# Patient Record
Sex: FEMALE | Race: WHITE | NOT HISPANIC OR LATINO | URBAN - METROPOLITAN AREA
[De-identification: names, ages, dates, MRNs, and addresses within clinical notes are randomized per-mention and may not be internally consistent; named-entity substitution may affect disease eponyms.]

---

## 2023-01-07 ENCOUNTER — EMERGENCY (EMERGENCY)
Facility: HOSPITAL | Age: 88
LOS: 0 days | Discharge: HOME | End: 2023-01-08
Attending: EMERGENCY MEDICINE
Payer: COMMERCIAL

## 2023-01-07 VITALS — WEIGHT: 110.23 LBS | HEIGHT: 60.63 IN

## 2023-01-07 DIAGNOSIS — S60.222A CONTUSION OF LEFT HAND, INITIAL ENCOUNTER: ICD-10-CM

## 2023-01-07 DIAGNOSIS — Z23 ENCOUNTER FOR IMMUNIZATION: ICD-10-CM
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
## 2023-01-07 DIAGNOSIS — M85.88 OTHER SPECIFIED DISORDERS OF BONE DENSITY AND STRUCTURE, OTHER SITE: ICD-10-CM

## 2023-01-07 DIAGNOSIS — R91.1 SOLITARY PULMONARY NODULE: ICD-10-CM

## 2023-01-07 DIAGNOSIS — H40.9 UNSPECIFIED GLAUCOMA: ICD-10-CM

## 2023-01-07 DIAGNOSIS — I70.0 ATHEROSCLEROSIS OF AORTA: ICD-10-CM

## 2023-01-07 DIAGNOSIS — K44.9 DIAPHRAGMATIC HERNIA WITHOUT OBSTRUCTION OR GANGRENE: ICD-10-CM
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
## 2023-01-07 DIAGNOSIS — S01.81XA LACERATION WITHOUT FOREIGN BODY OF OTHER PART OF HEAD, INITIAL ENCOUNTER: ICD-10-CM

## 2023-01-07 DIAGNOSIS — S00.03XA CONTUSION OF SCALP, INITIAL ENCOUNTER: ICD-10-CM

## 2023-01-07 DIAGNOSIS — K57.30 DIVERTICULOSIS OF LARGE INTESTINE WITHOUT PERFORATION OR ABSCESS WITHOUT BLEEDING: ICD-10-CM

## 2023-01-07 DIAGNOSIS — I10 ESSENTIAL (PRIMARY) HYPERTENSION: ICD-10-CM

## 2023-01-07 DIAGNOSIS — I25.10 ATHEROSCLEROTIC HEART DISEASE OF NATIVE CORONARY ARTERY WITHOUT ANGINA PECTORIS: ICD-10-CM

## 2023-01-07 DIAGNOSIS — M43.12 SPONDYLOLISTHESIS, CERVICAL REGION: ICD-10-CM

## 2023-01-07 DIAGNOSIS — S80.02XA CONTUSION OF LEFT KNEE, INITIAL ENCOUNTER: ICD-10-CM
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
## 2023-01-07 DIAGNOSIS — N28.1 CYST OF KIDNEY, ACQUIRED: ICD-10-CM

## 2023-01-07 DIAGNOSIS — M25.78 OSTEOPHYTE, VERTEBRAE: ICD-10-CM

## 2023-01-07 DIAGNOSIS — E78.5 HYPERLIPIDEMIA, UNSPECIFIED: ICD-10-CM

## 2023-01-07 LAB
ALBUMIN SERPL ELPH-MCNC: 3.5 G/DL — SIGNIFICANT CHANGE UP (ref 3.3–5)
ALP SERPL-CCNC: 60 U/L — SIGNIFICANT CHANGE UP (ref 40–120)
ALT FLD-CCNC: 14 U/L — SIGNIFICANT CHANGE UP (ref 12–78)
ANION GAP SERPL CALC-SCNC: 8 MMOL/L — SIGNIFICANT CHANGE UP (ref 5–17)
APTT BLD: 21.7 SEC — LOW (ref 27.5–35.5)
AST SERPL-CCNC: 19 U/L — SIGNIFICANT CHANGE UP (ref 15–37)
BASOPHILS # BLD AUTO: 0.06 K/UL — SIGNIFICANT CHANGE UP (ref 0–0.2)
BASOPHILS NFR BLD AUTO: 0.6 % — SIGNIFICANT CHANGE UP (ref 0–2)
BILIRUB SERPL-MCNC: 0.3 MG/DL — SIGNIFICANT CHANGE UP (ref 0.2–1.2)
BLD GP AB SCN SERPL QL: SIGNIFICANT CHANGE UP
BUN SERPL-MCNC: 30 MG/DL — HIGH (ref 7–23)
CALCIUM SERPL-MCNC: 9.1 MG/DL — SIGNIFICANT CHANGE UP (ref 8.5–10.1)
CHLORIDE SERPL-SCNC: 114 MMOL/L — HIGH (ref 96–108)
CK SERPL-CCNC: 53 U/L — SIGNIFICANT CHANGE UP (ref 26–192)
CO2 SERPL-SCNC: 20 MMOL/L — LOW (ref 22–31)
CREAT SERPL-MCNC: 0.78 MG/DL — SIGNIFICANT CHANGE UP (ref 0.5–1.3)
EGFR: 73 ML/MIN/1.73M2 — SIGNIFICANT CHANGE UP
EOSINOPHIL # BLD AUTO: 0.16 K/UL — SIGNIFICANT CHANGE UP (ref 0–0.5)
EOSINOPHIL NFR BLD AUTO: 1.6 % — SIGNIFICANT CHANGE UP (ref 0–6)
ETHANOL SERPL-MCNC: <10 MG/DL — SIGNIFICANT CHANGE UP (ref 0–10)
FLUAV AG NPH QL: SIGNIFICANT CHANGE UP
FLUBV AG NPH QL: SIGNIFICANT CHANGE UP
GLUCOSE BLDC GLUCOMTR-MCNC: 109 MG/DL — HIGH (ref 70–99)
GLUCOSE SERPL-MCNC: 126 MG/DL — HIGH (ref 70–99)
HCT VFR BLD CALC: 40.4 % — SIGNIFICANT CHANGE UP (ref 34.5–45)
HGB BLD-MCNC: 12.8 G/DL — SIGNIFICANT CHANGE UP (ref 11.5–15.5)
IMM GRANULOCYTES NFR BLD AUTO: 0.4 % — SIGNIFICANT CHANGE UP (ref 0–0.9)
INR BLD: 0.94 RATIO — SIGNIFICANT CHANGE UP (ref 0.88–1.16)
LIDOCAIN IGE QN: 255 U/L — SIGNIFICANT CHANGE UP (ref 73–393)
LYMPHOCYTES # BLD AUTO: 1.28 K/UL — SIGNIFICANT CHANGE UP (ref 1–3.3)
LYMPHOCYTES # BLD AUTO: 12.6 % — LOW (ref 13–44)
MCHC RBC-ENTMCNC: 30.5 PG — SIGNIFICANT CHANGE UP (ref 27–34)
MCHC RBC-ENTMCNC: 31.7 GM/DL — LOW (ref 32–36)
MCV RBC AUTO: 96.4 FL — SIGNIFICANT CHANGE UP (ref 80–100)
MONOCYTES # BLD AUTO: 0.65 K/UL — SIGNIFICANT CHANGE UP (ref 0–0.9)
MONOCYTES NFR BLD AUTO: 6.4 % — SIGNIFICANT CHANGE UP (ref 2–14)
NEUTROPHILS # BLD AUTO: 7.93 K/UL — HIGH (ref 1.8–7.4)
NEUTROPHILS NFR BLD AUTO: 78.4 % — HIGH (ref 43–77)
PLATELET # BLD AUTO: 441 K/UL — HIGH (ref 150–400)
POTASSIUM SERPL-MCNC: 3.8 MMOL/L — SIGNIFICANT CHANGE UP (ref 3.5–5.3)
POTASSIUM SERPL-SCNC: 3.8 MMOL/L — SIGNIFICANT CHANGE UP (ref 3.5–5.3)
PROT SERPL-MCNC: 7.2 GM/DL — SIGNIFICANT CHANGE UP (ref 6–8.3)
PROTHROM AB SERPL-ACNC: 10.9 SEC — SIGNIFICANT CHANGE UP (ref 10.5–13.4)
RBC # BLD: 4.19 M/UL — SIGNIFICANT CHANGE UP (ref 3.8–5.2)
RBC # FLD: 13.5 % — SIGNIFICANT CHANGE UP (ref 10.3–14.5)
RSV RNA NPH QL NAA+NON-PROBE: SIGNIFICANT CHANGE UP
SARS-COV-2 RNA SPEC QL NAA+PROBE: SIGNIFICANT CHANGE UP
SODIUM SERPL-SCNC: 142 MMOL/L — SIGNIFICANT CHANGE UP (ref 135–145)
TROPONIN I, HIGH SENSITIVITY RESULT: 11.76 NG/L — SIGNIFICANT CHANGE UP
WBC # BLD: 10.12 K/UL — SIGNIFICANT CHANGE UP (ref 3.8–10.5)
WBC # FLD AUTO: 10.12 K/UL — SIGNIFICANT CHANGE UP (ref 3.8–10.5)

## 2023-01-07 PROCEDURE — 71045 X-RAY EXAM CHEST 1 VIEW: CPT

## 2023-01-07 PROCEDURE — 83690 ASSAY OF LIPASE: CPT

## 2023-01-07 PROCEDURE — 82962 GLUCOSE BLOOD TEST: CPT

## 2023-01-07 PROCEDURE — 90471 IMMUNIZATION ADMIN: CPT

## 2023-01-07 PROCEDURE — 93005 ELECTROCARDIOGRAM TRACING: CPT

## 2023-01-07 PROCEDURE — 74177 CT ABD & PELVIS W/CONTRAST: CPT | Mod: MA

## 2023-01-07 PROCEDURE — 86901 BLOOD TYPING SEROLOGIC RH(D): CPT

## 2023-01-07 PROCEDURE — 73552 X-RAY EXAM OF FEMUR 2/>: CPT | Mod: LT

## 2023-01-07 PROCEDURE — 86850 RBC ANTIBODY SCREEN: CPT

## 2023-01-07 PROCEDURE — 70450 CT HEAD/BRAIN W/O DYE: CPT | Mod: MA

## 2023-01-07 PROCEDURE — 85025 COMPLETE CBC W/AUTO DIFF WBC: CPT

## 2023-01-07 PROCEDURE — 99285 EMERGENCY DEPT VISIT HI MDM: CPT | Mod: 25

## 2023-01-07 PROCEDURE — 84484 ASSAY OF TROPONIN QUANT: CPT

## 2023-01-07 PROCEDURE — 85610 PROTHROMBIN TIME: CPT

## 2023-01-07 PROCEDURE — 80053 COMPREHEN METABOLIC PANEL: CPT

## 2023-01-07 PROCEDURE — 73610 X-RAY EXAM OF ANKLE: CPT | Mod: LT

## 2023-01-07 PROCEDURE — 90715 TDAP VACCINE 7 YRS/> IM: CPT

## 2023-01-07 PROCEDURE — 73140 X-RAY EXAM OF FINGER(S): CPT | Mod: LT

## 2023-01-07 PROCEDURE — 73502 X-RAY EXAM HIP UNI 2-3 VIEWS: CPT | Mod: LT

## 2023-01-07 PROCEDURE — 36415 COLL VENOUS BLD VENIPUNCTURE: CPT

## 2023-01-07 PROCEDURE — 0241U: CPT

## 2023-01-07 PROCEDURE — 73590 X-RAY EXAM OF LOWER LEG: CPT | Mod: LT

## 2023-01-07 PROCEDURE — 71260 CT THORAX DX C+: CPT | Mod: MA

## 2023-01-07 PROCEDURE — 85730 THROMBOPLASTIN TIME PARTIAL: CPT

## 2023-01-07 PROCEDURE — 99285 EMERGENCY DEPT VISIT HI MDM: CPT

## 2023-01-07 PROCEDURE — 86900 BLOOD TYPING SEROLOGIC ABO: CPT

## 2023-01-07 PROCEDURE — 93010 ELECTROCARDIOGRAM REPORT: CPT

## 2023-01-07 PROCEDURE — 73562 X-RAY EXAM OF KNEE 3: CPT | Mod: LT

## 2023-01-07 PROCEDURE — 82550 ASSAY OF CK (CPK): CPT

## 2023-01-07 PROCEDURE — 72125 CT NECK SPINE W/O DYE: CPT | Mod: MA

## 2023-01-07 PROCEDURE — 80307 DRUG TEST PRSMV CHEM ANLYZR: CPT

## 2023-01-07 PROCEDURE — 96374 THER/PROPH/DIAG INJ IV PUSH: CPT

## 2023-01-07 RX ORDER — SODIUM CHLORIDE 9 MG/ML
500 INJECTION INTRAMUSCULAR; INTRAVENOUS; SUBCUTANEOUS ONCE
Refills: 0 | Status: COMPLETED | OUTPATIENT
Start: 2023-01-07 | End: 2023-01-07

## 2023-01-07 RX ORDER — TETANUS TOXOID, REDUCED DIPHTHERIA TOXOID AND ACELLULAR PERTUSSIS VACCINE, ADSORBED 5; 2.5; 8; 8; 2.5 [IU]/.5ML; [IU]/.5ML; UG/.5ML; UG/.5ML; UG/.5ML
0.5 SUSPENSION INTRAMUSCULAR ONCE
Refills: 0 | Status: COMPLETED | OUTPATIENT
Start: 2023-01-07 | End: 2023-01-07

## 2023-01-07 RX ORDER — ONDANSETRON 8 MG/1
4 TABLET, FILM COATED ORAL ONCE
Refills: 0 | Status: COMPLETED | OUTPATIENT
Start: 2023-01-07 | End: 2023-01-07

## 2023-01-07 RX ADMIN — TETANUS TOXOID, REDUCED DIPHTHERIA TOXOID AND ACELLULAR PERTUSSIS VACCINE, ADSORBED 0.5 MILLILITER(S): 5; 2.5; 8; 8; 2.5 SUSPENSION INTRAMUSCULAR at 22:59

## 2023-01-07 RX ADMIN — ONDANSETRON 4 MILLIGRAM(S): 8 TABLET, FILM COATED ORAL at 23:50

## 2023-01-07 RX ADMIN — SODIUM CHLORIDE 500 MILLILITER(S): 9 INJECTION INTRAMUSCULAR; INTRAVENOUS; SUBCUTANEOUS at 22:00

## 2023-01-07 NOTE — ED PROVIDER NOTE - NSFOLLOWUPINSTRUCTIONS_ED_ALL_ED_FT
ED workup for acute trauma: negative.  Tylenol 325 mg 2 tabs every 6 hours as needed for headache, aches & pains.  Continue your regular medications as per routine.  Rest.  Minimize physical activity.  Follow up with own doctor when you arrive back home.        Lesión en la bela en los adultos    Head Injury, Adult       Hay muchos tipos de lesiones en la bela. Algunas pueden ser tan leves dileep un chichón pequeño. Otras pueden ser más graves. Ejemplos de lesiones graves:  •Un golpe jennifer en la bela que sacude el cerebro hacia dm y atrás (conmoción cerebral).      •Un moretón (contusión) en el cerebro. Eros significa que hay hemorragia en el cerebro que puede causar hinchazón.      •Fisura en el cráneo (fractura de cráneo).      •Hemorragia en el cerebro que se acumula, se espesa (se produce un coágulo) y forma un bulto (hematoma).      La mayoría de los problemas provocados por josiane lesión en la bela ocurren vinicius las primeras 24 horas. Sin embargo, es posible que siga teniendo efectos secundarios entre 7 y 10 días después de la lesión. Es importante controlar vaughn afección para ruma si hay cambios. Es posible que deban observarlo en el departamento de emergencias o en el servicio de atención urgente, o puede ser necesario que se quede en el hospital.      ¿Cuáles son las causas?    Hay muchas causas posibles de josiane lesión en la bela. Josiane lesión en la bela puede tener estas causas:  •Un accidente automovilístico.      •Accidentes en bicicleta o motocicleta.      •Lesiones deportivas.      •Caídas.      •Ser golpeado por un objeto.        ¿Cuáles son los signos o síntomas?    Los síntomas de lesión en la bela incluyen un moretón, un chichón o un sangrado en el lugar de la lesión. Otros síntomas físicos pueden ser:  •Dolor de bela.      •Sensación de que va a vomitar (náuseas) o vomitar.      •Mareos.      •Visión borrosa o doble.      •Sentir incomodidad cerca de luces brillantes o ruidos kaylah.      •Temblores que no puede controlar (convulsiones).      •Cansancio.      •Dificultad para despertarse.      •Desmayos o pérdida de la conciencia.      Los síntomas mentales o emocionales pueden incluir los siguientes:  •Sentirse abrumado o malhumorado.      •Confusión y problemas de memoria.      •Tener problemas para prestar atención o concentrarse.      •Cambios en los hábitos de alimentación o en el sueño.      •Sentirse preocupado o nervioso (ansioso).      •Sentirse jeremie (deprimido).        ¿Cómo se trata?    El tratamiento de esta afección depende de la gravedad de la lesión y del tipo de lesión que sufrió. El objetivo principal es prevenir problemas y darle tiempo al cerebro para que se recupere.    Lesión de bela leve     Si usted sufre josiane lesión de bela leve, es posible que lo envíen a casa, y el tratamiento puede incluir lo siguiente:  •Estar en observación. Un adulto responsable debe quedarse con usted vinicius 24 horas después de producida la lesión y controlarlo con frecuencia.      •[place] físico.      •[place] cerebral.      •Analgésicos.      Lesión de bela grave    Si tiene josiane lesión de bela grave, el tratamiento puede incluir lo siguiente:  •Estar en observación cercana. Eros incluye permanecer en el hospital.    •Medicamentos para:  •Ayudar a aliviar el dolor.      •Evitar las convulsiones.      •Ayudar con la hinchazón del cerebro.        •Proteger las vías respiratorias y usar josiane máquina que lo ayude a respirar (respirador).      •Tratamientos para observar y tratar la hinchazón dentro del cerebro.    •Cirugía de cerebro. Esta puede ser necesaria en los siguientes casos:  •Extraer josiane acumulación de raghavendra o coágulos de raghavendra.      •Interrumpir el sangrado.      •Retirar josiane parte del cráneo. Eros permite que el cerebro tenga lugar para hincharse.          Siga estas instrucciones en vaughn casa:    Actividad     •Raz reposo.      •Evite las actividades difíciles o cansadoras.      •Asegúrese de dormir lo suficiente.    •Deje que el cerebro descanse. Para hacerlo, limite las actividades que requieran pensar mucho o prestar mucha atención, dileep las siguientes:  •Mirar televisión.      •Jugar juegos de memoria y armar rompecabezas.      •Tareas para el hogar o trabajos relacionados con el empleo.      •Trabajar en la computadora, participar en redes sociales y enviar mensajes de texto.        •Evite las actividades que pudieran provocar otra lesión en la bela hasta que el médico lo autorice. Entre ellas, practicar deportes. Puede ser peligroso tener otra lesión en la bela antes de que se haya recuperado de la primera.      •Pregunte al médico cuándo puede regresar a las actividades normales sin riesgo, dileep al trabajo o a la escuela. Pida al médico un plan detallado para volver a realizar las actividades habituales de manera progresiva.      •Consulte a vaughn médico cuándo puede conducir, andar en bicicleta o usar maquinaria pesada. No realice estas actividades si se siente mareado.        Estilo de beckie      • No alexandria alcohol hasta que el médico lo autorice.      • No consuma drogas.      •Si le resulta más difícil que lo habitual recordar las cosas, escríbalas.      •Trate de hacer josiane cosa por vez si se distrae con facilidad.      •Consulte con familiares y amigos si debe alphonse decisiones importantes.      •Cuénteles a shy amigos, familiares, colegas de confianza y vaughn gerente en el trabajo sobre vaughn lesión, los síntomas y shy límites (restricciones). Pídales que observen si aparecen nuevos problemas o empeoran los existentes.      Instrucciones generales     •Starks los medicamentos de venta jayne y los recetados solamente dileep se lo haya indicado el médico.      •Pídale a alguien que lo acompañe vinicius 24 horas después de la lesión en la bela. Esta persona debe observar si hay cambios en los síntomas y estar preparada para obtener ayuda.      •Concurra a todas las visitas de seguimiento dileep se lo haya indicado el médico. Eros es importante.      ¿Cómo se melisa?     •Trabaje en vaughn equilibrio y vaughn fuerza. Eros puede ayudarlo a evitar caídas.      •Use el cinturón de seguridad cuando se encuentre en un vehículo en movimiento.    •Use un hilario cuando realice las siguientes actividades:  •Andar en bicicleta.      •Esquiar.      •Practicar algún otro deporte o actividad que representen un riesgo de lesión.      •Si dave alcohol:•Limite la cantidad que dave:  •De 0 a 1 medida por día para las mujeres que no estén embarazadas.      •De 0 a 2 medidas por día para los hombres.        •Esté atento a la cantidad de alcohol que hay en las bebidas que dominik. En los [place], josiane medida equivale a josiane botella de cerveza de 12 oz (355 ml), un vaso de vino de 5 oz (148 ml) o un vaso de josiane bebida alcohólica de shala graduación de 1½ oz (44 ml).      •Raz de vaughn hogar un lugar más seguro:  •Deshacerse de los obstáculos en pisos y escaleras. Eros incluye las cosas que pueden hacer que se tropiece.      •Coloque barras para sostén en los bi y pasamanos en las escaleras.      •Ponga alfombras antideslizantes en pisos y bañeras.      •Mejore la iluminación de las zonas oscuras.          Dónde buscar más información    •Centers for Disease Control and Prevention (Centros para el Control y la Prevención de Enfermedades): www.cdc.gov        Solicite ayuda de inmediato si:  •Tiene lo siguiente:  •Dolor de bela muy jennifer que no se calma con medicamentos.      •Dificultad para caminar o debilidad en los brazos o las piernas.      •Secreción transparente o con raghavendra que proviene de la nariz o de los oídos.      •Cambios en la forma de ruma (visión).      •Josiane convulsión.      •Más confusión o está más gruñón.        •Shy síntomas empeoran.      •Está más somnoliento de lo normal o tiene dificultad para mantenerse despierto.      •Pierde el equilibrio.      •La parte central spring de los ojos (pupila) cambia de tamaño.      •Arrastra las palabras.      •Vaughn mareo empeora.      •Vomita.      Estos síntomas pueden indicar josiane emergencia. No espere a ruma si los síntomas desaparecen. Solicite atención médica de inmediato. Comuníquese con el servicio de emergencias de vaughn localidad (911 en los [place]). No conduzca por shy propios medios hasta el hospital.       Resumen    •Las lesiones en la bela pueden ser tan leves dileep un pequeño chichón. Otras pueden ser más graves.      •El tratamiento de esta afección depende de la gravedad de la lesión y del tipo de lesión que sufrió.      •Pídale a alguien que lo acompañe vinicius 24 horas después de la lesión en la bela.      •Pregunte al médico cuándo puede regresar a las actividades normales sin riesgo, dileep al trabajo o a la escuela.      •Para evitar josiane lesión en la bela, use cinturón de seguridad cuando se desplace en automóvil, use hilario cuando monte en bicicleta, limite el consumo de alcohol y raz que vaughn hogar sea más seguro.      Esta información no tiene dileep fin reemplazar el consejo del médico. Asegúrese de hacerle al médico cualquier pregunta que tenga.              Contusión    Contusion      Josiane contusión es un hematoma profundo. Es el resultado de josiane lesión que causa sangrado debajo de la piel. Los síntomas de hematoma incluyen dolor, hinchazón y cambio de color en la piel. La piel puede ponerse eduard, morada o amarilla.      Siga estas indicaciones en vaughn casa:      Control del dolor, el entumecimiento y la hinchazón   Bag of ice on a towel on the skin.   Puede usar RHCE. Eros significa:  •Hacer reposo.      •Aplicar hielo.      •Aplicar presión, o compresión.      •Poner en alto, o elevar, la mera lesionada.      Para seguir dirk método, raz lo siguiente:  •Mantenga la mera de la lesión en reposo.    •Aplique hielo sobre la mera lesionada, si se lo indican.  •Ponga el hielo en josiane bolsa plástica.      •Coloque josiane toalla entre la piel y la bolsa.      •Coloque el hielo vinicius 20 minutos, 2 a 3 veces al día.        •Si se lo indican, ejerza josiane presión suave (compresión) en la mera de la lesión con josiane venda elástica. Asegúrese de que la venda no esté muy ajustada. Si siente hormigueo o adormecimiento en la mera, quítesela y vuelva a colocarla dileep se lo haya indicado el médico.      •Si es posible, cuando esté sentado o acostado, levante (eleve) la mera lesionada por encima del nivel del corazón.      Indicaciones generales     •Use los medicamentos de venta jayne y los recetados solamente dileep se lo haya indicado el médico.      •Concurra a todas las visitas de seguimiento dileep se lo haya indicado el médico. Eros es importante.        Comuníquese con un médico si:    •Los síntomas no mejoran después de varios días de tratamiento.      •Shy síntomas empeoran.      •Tiene dificultad para  la mera de la lesión.        Solicite ayuda inmediatamente si:    •Siente mucho dolor.      •Pierde la sensibilidad (adormecimiento) en josiane mano o un pie.      •La mano o el pie están pálidos o fríos.        Resumen    •Josiane contusión es un hematoma profundo. Es el resultado de josiane lesión que causa sangrado debajo de la piel.      •Los síntomas de hematoma incluyen dolor, hinchazón y cambio de color en la piel. La piel puede ponerse eduard, morada o amarilla.      •El tratamiento para esta afección incluye hacer reposo, aplicarse hielo, compresión y elevar la mera afectada. Eros también se denomina RHCE. Es posible que le den analgésicos de venta jayne.      •Comuníquese con un médico si no se siente mejor o si se siente peor. Solicite ayuda de inmediato si tiene dolor muy intenso, si pierde la sensibilidad en josiane mano o en un pie, o si la mera se torna pálida o fría.      Esta información no tiene dileep fin reemplazar el consejo del médico. Asegúrese de hacerle al médico cualquier pregunta que tenga.

## 2023-01-07 NOTE — ED PROVIDER NOTE - PROGRESS NOTE DETAILS
Moira Carlton for attending Dr. Rm   Pt brought to [facility] Main room 5. Pt is being evaluated by Dr. Rm. Upgrading pt to trauma alert. Moira Carlton for attending Dr. Rm   Daughter reports that pt has hx of "kidney not so good" Will hold CT's pending lab results to evaluate GFR for IV study. CHRISTIAN Rm MD:  GFR good at 73.  After I spoke with pt & family at bedside, they now agree with CT studies to be done with IV contrast.  CT tech aware to expedite CTs & then XRs to be done. CHRISTIAN Rm MD:  XRs, CTs w/o acute traumatic pathology.  This was d/w pt & daughters at bedside: they expressed their understanding.

## 2023-01-07 NOTE — ED PROVIDER NOTE - CLINICAL SUMMARY MEDICAL DECISION MAKING FREE TEXT BOX
89 yo female, French speaking hx of HTN, HLD, +aspirin BID presents BIB private car from home s/p mechanical fall down stairs ?7-8 steps, +head injury, left thumb base, left knee/diffuse left leg pain. No LOC. Neuro intact, abd benign. Plan: trauma alert: pan scan, trauma labs, XR's left thumb, pelvis, left leg, EKG, cautious IV fluids, TDAP, left forehead lac repair. 88 yo female, Maltese speaking hx of HTN, HLD, +aspirin BID presents BIB private car from home s/p mechanical fall down stairs ?7-8 steps, +head injury, left thumb base, left knee/diffuse left leg pain. No LOC. Neuro intact, abd benign.   Plan: trauma alert: pan scan, trauma labs, XR's left thumb, pelvis, left leg, EKG, cautious IV fluids, TDAP, left forehead lac repair.    Addendum:  Labs results, XRs, CTs negative for acute traumatic pathology: pt & family at bedside made aware.  L forehead lac + superficial, steri-strips applied by resident.  ED RN to assess ambulation trial, expect DC home.

## 2023-01-07 NOTE — ED ADULT NURSE NOTE - CHIEF COMPLAINT QUOTE
brought in by family, reports fall down 5 stairs at home. presents with hematoma to left wrist, left knee, bump on head. denies loss of consciousness. reports that she used to take aspirin, but has not lately. denies other anticoagulant use. alert and oriented x 4, perrl, answering questions appropriately.

## 2023-01-07 NOTE — ED PROVIDER NOTE - SECONDARY DIAGNOSIS.
Contusion of left knee, initial encounter Contusion of left hand, initial encounter Fall down stairs, initial encounter Forehead laceration, initial encounter

## 2023-01-07 NOTE — ED PROVIDER NOTE - OBJECTIVE STATEMENT
89 yo female w/PMHx of HTN, glaucoma, HLD, recurrent UTI presents to the ED BIB by private car s/p fall down 7-8 steps at home. Pt has a head injury. No LOC. Pt has a hematoma to the left wrist and left knee. Pt is on a baby aspirin. Pt is visiting here from [place]. Pt is [place] and Ecuadorean speaking.  Daughter at bedside as . Daughter wasn't present when pt fell but was told that she fell from the top of the steps which occurred around 6:40PM today. Pt has lacerations to base of left thumb, vertex scalp left forehead, above the right eyebrow and swelling to the left knee. Most antibx gives GI upset but no allergies. No HA. No CP or SOB. Pt reports that the whole left leg hurts. Pt felt initial nausea but self resolved without recurrence, no nausea currently. Pt has some reported kidney issues from daughter. 89 yo female w/PMHx of HTN, glaucoma, HLD, recurrent UTI presents to the ED BIB by private car s/p fall down 7-8 steps at home. + head injury. No LOC. Pt has a hematoma to the left wrist and left knee. Pt is on a baby aspirin. Pt is visiting here from [place]. Pt is Malay and Yi speaking.  Daughter at bedside is . Daughter wasn't present when pt fell but was told that she fell from the top of the steps which occurred around 6:40PM today. Pt has lacerations to base of left thumb, vertex scalp left forehead, above the right eyebrow and swelling to the left knee. Most antibx gives GI upset but no allergies. No HA. No CP or SOB. Pt reports that the whole left leg hurts. Pt felt initial nausea but self resolved without recurrence, no nausea currently. Pt has some reported kidney issues as per daughter.

## 2023-01-07 NOTE — ED ADULT TRIAGE NOTE - MODE OF ARRIVAL
Walk in Private Auto [Home] : at home, [unfilled] , at the time of the visit. [Medical Office: ([facility])___] : at the medical office located in  [Verbal consent obtained from patient] : the patient, [unfilled] [de-identified] : 31 year old female is here for a followup visit for mood, anxious, t consent given\par \par patient overwhelmed, anxious, added buspar, did not like it, did not like abilify\par

## 2023-01-07 NOTE — ED ADULT TRIAGE NOTE - CHIEF COMPLAINT QUOTE
brought in by family, reports fall down 5 stairs at home. denies loss of consciousness. presents with hematoma to left wrist, left knee, bump on head. denies loss of consciousness. reports that she used to take aspirin, but has not lately. denies other anticoagulant use. brought in by family, reports fall down 5 stairs at home. presents with hematoma to left wrist, left knee, bump on head. denies loss of consciousness. reports that she used to take aspirin, but has not lately. denies other anticoagulant use. brought in by family, reports fall down 5 stairs at home. presents with hematoma to left wrist, left knee, bump on head. denies loss of consciousness. reports that she used to take aspirin, but has not lately. denies other anticoagulant use. alert and oriented x 4, perrl, answering questions appropriately.

## 2023-01-07 NOTE — ED PROVIDER NOTE - ENMT, MLM
Small vertex scalp hematoma, overlying skin intact, left forehead 1.5cm flap laceration to the lower left forehead just above the eyebrow, no active bleeding, no deformity. Orellana's negative. oropharynx clear, mucous membranes mildly dry, +dentures. No clincal evidence of facial injuty Small vertex scalp hematoma, overlying skin intact, left forehead 1.5cm flap laceration to the lower left forehead just above the eyebrow, no active bleeding, no deformity. Orellana's negative. oropharynx clear, mucous membranes mildly dry, +dentures. No clinical evidence of facial injury

## 2023-01-07 NOTE — ED PROVIDER NOTE - NSICDXPASTMEDICALHX_GEN_ALL_CORE_FT
PAST MEDICAL HISTORY:  HLD (hyperlipidemia)     HTN (hypertension)     UTI (urinary tract infection)

## 2023-01-07 NOTE — ED PROVIDER NOTE - CARE PLAN
1 Principal Discharge DX:	Closed head injury without loss of consciousness, initial encounter  Secondary Diagnosis:	Contusion of left knee, initial encounter  Secondary Diagnosis:	Contusion of left hand, initial encounter  Secondary Diagnosis:	Fall down stairs, initial encounter  Secondary Diagnosis:	Forehead laceration, initial encounter

## 2023-01-07 NOTE — ED ADULT NURSE NOTE - PRIMARY CARE PROVIDER
"Chief Complaint   Patient presents with     Musculoskeletal Problem       Initial /64 (BP Location: Right arm, Patient Position: Chair, Cuff Size: Adult Regular)   Pulse 80   Temp 97.6  F (36.4  C) (Tympanic)   Resp 18   Ht 1.626 m (5' 4\")   Wt 79.8 kg (176 lb)   BMI 30.21 kg/m   Estimated body mass index is 30.21 kg/m  as calculated from the following:    Height as of this encounter: 1.626 m (5' 4\").    Weight as of this encounter: 79.8 kg (176 lb).    Patient presents to the clinic using No DME    Health Maintenance that is potentially due pending provider review:  NONE    Zainab Romero MA  3:49 PM 5/13/2019  .      "
unknown

## 2023-01-07 NOTE — ED PROVIDER NOTE - MUSCULOSKELETAL, MLM
neck nontender, supple without pain. back, chest wall, pelvis nontender, stable. upper extremities move well, ecchymotic swelling to the base of the first metacarpal of left hand, +tender, pain with movement, left wrist nontender, normal range of motion. Normal radial pulses. Rest of the upper extremity exam nontender with normal ROM.  LLE, SLR 3-+ degrees without pain, normal motor, left knee with anterior effusion tender. decreased ROM due to pain. Slight tenderness to left ankle, no effusion. AFROM of left ankle, joint stable. LLE distal motor intact, DP pulse normal. RLE normal exam, SLR 40 degrees woithout pain, right hip and knee AFROM nontender with no pain in ROM.

## 2023-01-07 NOTE — ED PROVIDER NOTE - PATIENT PORTAL LINK FT
You can access the FollowMyHealth Patient Portal offered by [facility] by registering at the following website: [email or web address]. By joining [employer]’s FollowMyHealth portal, you will also be able to view your health information using other applications (apps) compatible with our system.

## 2023-01-08 VITALS
TEMPERATURE: 99 F | SYSTOLIC BLOOD PRESSURE: 136 MMHG | DIASTOLIC BLOOD PRESSURE: 70 MMHG | RESPIRATION RATE: 18 BRPM | OXYGEN SATURATION: 100 % | HEART RATE: 86 BPM

## 2023-01-08 LAB — ABO RH CONFIRMATION: SIGNIFICANT CHANGE UP

## 2023-01-08 PROCEDURE — 71260 CT THORAX DX C+: CPT | Mod: 26,MA

## 2023-01-08 PROCEDURE — 73610 X-RAY EXAM OF ANKLE: CPT | Mod: 26,LT

## 2023-01-08 PROCEDURE — 72125 CT NECK SPINE W/O DYE: CPT | Mod: 26,MA

## 2023-01-08 PROCEDURE — 70450 CT HEAD/BRAIN W/O DYE: CPT | Mod: 26,MA

## 2023-01-08 PROCEDURE — 74177 CT ABD & PELVIS W/CONTRAST: CPT | Mod: 26,MA

## 2023-01-08 PROCEDURE — 73590 X-RAY EXAM OF LOWER LEG: CPT | Mod: 26,LT

## 2023-01-08 PROCEDURE — 73562 X-RAY EXAM OF KNEE 3: CPT | Mod: 26,LT

## 2023-01-08 PROCEDURE — 73140 X-RAY EXAM OF FINGER(S): CPT | Mod: 26,LT

## 2023-01-08 PROCEDURE — 73502 X-RAY EXAM HIP UNI 2-3 VIEWS: CPT | Mod: 26,LT

## 2023-01-08 PROCEDURE — 71045 X-RAY EXAM CHEST 1 VIEW: CPT | Mod: 26

## 2023-01-08 PROCEDURE — 73552 X-RAY EXAM OF FEMUR 2/>: CPT | Mod: 26,LT

## 2023-01-08 RX ORDER — ACETAMINOPHEN 500 MG
650 TABLET ORAL ONCE
Refills: 0 | Status: COMPLETED | OUTPATIENT
Start: 2023-01-08 | End: 2023-01-08

## 2023-01-08 RX ADMIN — Medication 650 MILLIGRAM(S): at 01:23

## 2023-10-23 NOTE — ED ADULT TRIAGE NOTE - BSA (M2)
Patient with Paroxysmal (<7 days) atrial fibrillation which is controlled currently with propafenone, toprol. Patient is currently in atrial fibrillation with rates 80-110s. [id number] Score: 3. Anticoagulation indicated and had discussed xarelto but has not started due to recent biopsy with plans to discuss in clinic with Dr. Hodgson.    -continue propafenone 225 BID, toprol daily  -patient would like to discuss with primary cardiologist on starting xarelto. Will start on heparin gtt  -CT head negative for any bleeding after fall  -discussed possible cardiology consult in AM and possible GAYATHRI/DCCV prior to discharge if patient remains in Afib   1.46